# Patient Record
Sex: FEMALE | Race: WHITE | NOT HISPANIC OR LATINO | Employment: FULL TIME | URBAN - METROPOLITAN AREA
[De-identification: names, ages, dates, MRNs, and addresses within clinical notes are randomized per-mention and may not be internally consistent; named-entity substitution may affect disease eponyms.]

---

## 2019-12-07 ENCOUNTER — OFFICE VISIT (OUTPATIENT)
Dept: URGENT CARE | Facility: CLINIC | Age: 59
End: 2019-12-07
Payer: COMMERCIAL

## 2019-12-07 ENCOUNTER — HOSPITAL ENCOUNTER (EMERGENCY)
Facility: MEDICAL CENTER | Age: 59
End: 2019-12-07
Attending: EMERGENCY MEDICINE
Payer: COMMERCIAL

## 2019-12-07 ENCOUNTER — APPOINTMENT (OUTPATIENT)
Dept: RADIOLOGY | Facility: MEDICAL CENTER | Age: 59
End: 2019-12-07
Attending: EMERGENCY MEDICINE
Payer: COMMERCIAL

## 2019-12-07 VITALS
TEMPERATURE: 98.2 F | SYSTOLIC BLOOD PRESSURE: 122 MMHG | DIASTOLIC BLOOD PRESSURE: 62 MMHG | BODY MASS INDEX: 25.4 KG/M2 | HEART RATE: 91 BPM | HEIGHT: 60 IN | RESPIRATION RATE: 12 BRPM | OXYGEN SATURATION: 98 % | WEIGHT: 129.4 LBS

## 2019-12-07 VITALS
HEART RATE: 64 BPM | WEIGHT: 128.53 LBS | DIASTOLIC BLOOD PRESSURE: 68 MMHG | BODY MASS INDEX: 25.91 KG/M2 | TEMPERATURE: 96.5 F | RESPIRATION RATE: 18 BRPM | HEIGHT: 59 IN | OXYGEN SATURATION: 95 % | SYSTOLIC BLOOD PRESSURE: 110 MMHG

## 2019-12-07 DIAGNOSIS — M54.9 UPPER BACK PAIN: ICD-10-CM

## 2019-12-07 DIAGNOSIS — R07.9 CHEST PAIN, UNSPECIFIED TYPE: ICD-10-CM

## 2019-12-07 DIAGNOSIS — R07.89 OTHER CHEST PAIN: ICD-10-CM

## 2019-12-07 DIAGNOSIS — K20.90 ESOPHAGITIS: ICD-10-CM

## 2019-12-07 LAB
ALBUMIN SERPL BCP-MCNC: 4.6 G/DL (ref 3.2–4.9)
ALBUMIN/GLOB SERPL: 1.3 G/DL
ALP SERPL-CCNC: 69 U/L (ref 30–99)
ALT SERPL-CCNC: 18 U/L (ref 2–50)
ANION GAP SERPL CALC-SCNC: 8 MMOL/L (ref 0–11.9)
AST SERPL-CCNC: 24 U/L (ref 12–45)
BASOPHILS # BLD AUTO: 0.9 % (ref 0–1.8)
BASOPHILS # BLD: 0.05 K/UL (ref 0–0.12)
BILIRUB SERPL-MCNC: 0.3 MG/DL (ref 0.1–1.5)
BUN SERPL-MCNC: 27 MG/DL (ref 8–22)
CALCIUM SERPL-MCNC: 9.8 MG/DL (ref 8.5–10.5)
CHLORIDE SERPL-SCNC: 105 MMOL/L (ref 96–112)
CO2 SERPL-SCNC: 27 MMOL/L (ref 20–33)
CREAT SERPL-MCNC: 1.25 MG/DL (ref 0.5–1.4)
EKG IMPRESSION: NORMAL
EOSINOPHIL # BLD AUTO: 0.14 K/UL (ref 0–0.51)
EOSINOPHIL NFR BLD: 2.7 % (ref 0–6.9)
ERYTHROCYTE [DISTWIDTH] IN BLOOD BY AUTOMATED COUNT: 39.8 FL (ref 35.9–50)
GLOBULIN SER CALC-MCNC: 3.6 G/DL (ref 1.9–3.5)
GLUCOSE SERPL-MCNC: 101 MG/DL (ref 65–99)
HCT VFR BLD AUTO: 40 % (ref 37–47)
HGB BLD-MCNC: 13.5 G/DL (ref 12–16)
IMM GRANULOCYTES # BLD AUTO: 0.01 K/UL (ref 0–0.11)
IMM GRANULOCYTES NFR BLD AUTO: 0.2 % (ref 0–0.9)
LIPASE SERPL-CCNC: 28 U/L (ref 11–82)
LYMPHOCYTES # BLD AUTO: 2.17 K/UL (ref 1–4.8)
LYMPHOCYTES NFR BLD: 41.2 % (ref 22–41)
MCH RBC QN AUTO: 30.6 PG (ref 27–33)
MCHC RBC AUTO-ENTMCNC: 33.8 G/DL (ref 33.6–35)
MCV RBC AUTO: 90.7 FL (ref 81.4–97.8)
MONOCYTES # BLD AUTO: 0.34 K/UL (ref 0–0.85)
MONOCYTES NFR BLD AUTO: 6.5 % (ref 0–13.4)
NEUTROPHILS # BLD AUTO: 2.56 K/UL (ref 2–7.15)
NEUTROPHILS NFR BLD: 48.5 % (ref 44–72)
NRBC # BLD AUTO: 0 K/UL
NRBC BLD-RTO: 0 /100 WBC
PLATELET # BLD AUTO: 354 K/UL (ref 164–446)
PMV BLD AUTO: 9.2 FL (ref 9–12.9)
POTASSIUM SERPL-SCNC: 3.9 MMOL/L (ref 3.6–5.5)
PROT SERPL-MCNC: 8.2 G/DL (ref 6–8.2)
RBC # BLD AUTO: 4.41 M/UL (ref 4.2–5.4)
SODIUM SERPL-SCNC: 140 MMOL/L (ref 135–145)
TROPONIN T SERPL-MCNC: <6 NG/L (ref 6–19)
WBC # BLD AUTO: 5.3 K/UL (ref 4.8–10.8)

## 2019-12-07 PROCEDURE — 84484 ASSAY OF TROPONIN QUANT: CPT

## 2019-12-07 PROCEDURE — 99284 EMERGENCY DEPT VISIT MOD MDM: CPT

## 2019-12-07 PROCEDURE — 85025 COMPLETE CBC W/AUTO DIFF WBC: CPT

## 2019-12-07 PROCEDURE — 80053 COMPREHEN METABOLIC PANEL: CPT

## 2019-12-07 PROCEDURE — A9270 NON-COVERED ITEM OR SERVICE: HCPCS | Performed by: EMERGENCY MEDICINE

## 2019-12-07 PROCEDURE — 83690 ASSAY OF LIPASE: CPT

## 2019-12-07 PROCEDURE — 71045 X-RAY EXAM CHEST 1 VIEW: CPT

## 2019-12-07 PROCEDURE — 93005 ELECTROCARDIOGRAM TRACING: CPT | Performed by: EMERGENCY MEDICINE

## 2019-12-07 PROCEDURE — 99205 OFFICE O/P NEW HI 60 MIN: CPT | Performed by: NURSE PRACTITIONER

## 2019-12-07 PROCEDURE — 36415 COLL VENOUS BLD VENIPUNCTURE: CPT

## 2019-12-07 PROCEDURE — 93000 ELECTROCARDIOGRAM COMPLETE: CPT | Performed by: NURSE PRACTITIONER

## 2019-12-07 PROCEDURE — 93005 ELECTROCARDIOGRAM TRACING: CPT

## 2019-12-07 PROCEDURE — 700102 HCHG RX REV CODE 250 W/ 637 OVERRIDE(OP): Performed by: EMERGENCY MEDICINE

## 2019-12-07 RX ADMIN — LIDOCAINE HYDROCHLORIDE 30 ML: 20 SOLUTION OROPHARYNGEAL at 20:51

## 2019-12-07 ASSESSMENT — ENCOUNTER SYMPTOMS
FEVER: 0
CHILLS: 0
BACK PAIN: 1

## 2019-12-08 NOTE — ED PROVIDER NOTES
"ED Provider Note    CHIEF COMPLAINT  Chief Complaint   Patient presents with   • Chest Pain     Described as \"burning,\" started 19, improved when drinking water, nothing makes pain worse   • Neck Pain     Radiates from chest       HPI  Georgette Connor is a 59 y.o. female who presents to the Emergency Department with chest pain which started about 5 days ago.  It is located epigastric area and substernal and feels like something is there,  and has an intensity of 4 with no radiation to the arm, but does radiate to throat.   There is no shortness of breath, no weakness, slight nausea, no abdominal pain, no back pain, no jaw pain, no diaphoresis.  She did have jaw pain the other dayC     CAD Risk factor review:  no CAD, no dislipidemia, positive father for family history-- in 80's of heart disease, no diabetes, nosmoking, no obesity, no hypertension, no cocaine or methamphetamines.    Pulmonary Embolist risk factor review:  no recent surgery, no history of DVT or PE, no hemoptysis,  No unilateral leg swelling, no malignancy, no BCP or hormone therapy.    REVIEW OF SYSTEMS   As above all other systems are negative.    PAST MEDICAL HISTORY   has no past medical history on file.  Hypothyroidism  FAMILY HISTORY  No family history on file.     SOCIAL HISTORY  Social History     Tobacco Use   • Smoking status: Never Smoker   • Smokeless tobacco: Never Used   Substance and Sexual Activity   • Alcohol use: Yes     Comment: Rarely   • Drug use: Never   • Sexual activity: Not on file       SURGICAL HISTORY  patient denies any surgical history    CURRENT MEDICATIONS  Reviewed.  See Encounter Summary.  Include No current facility-administered medications for this encounter.     Current Outpatient Medications:   •  Cholecalciferol (VITAMIN D PO), Take 2,500 Units by mouth., Disp: , Rfl:   •  Thyroid (NATURE-THROID PO), Take  by mouth., Disp: , Rfl:       ALLERGIES  No Known Allergies    PHYSICAL EXAM  VITAL SIGNS: /75  " " Pulse 74   Temp 35.8 °C (96.5 °F) (Temporal)   Resp 16   Ht 1.499 m (4' 11\")   Wt 58.3 kg (128 lb 8.5 oz)   LMP  (LMP Unknown)   SpO2 99%   BMI 25.96 kg/m²   Constitutional:  Alert , able to answer questions  HENT: Nose is normal in appearance, external ears are normal,  moist mucous membranes  Eyes: Anicteric,  pupils are equal round and reactive, there is no conjunctival drainage or pallor   Neck: The trachea is midline, there is no obvious mass or meningeal signs  Cardiovascular: Good perfusion,  regular rate and rhythm without murmurs gallops or rubs  Thorax & Lungs: Respiratory rate and effort are normal. There is normal chest excursion with respiration.  No wheezes rhonchi or rales noted.  Abdomen: Abdomen is normal in appearance, no gross peritoneal signs  normal bowel sounds, no pain with cough  :   No CVA tenderness to palpation  Musculoskeletal: No deformities noted in all 4 extremities.   Skin: Visualized skin is warm without rash.  Neurologic:  Cranial nerves II through XII are intact there is no focal abnormality noted.  Psychiatric: Normal mood and mentation    RADIOLOGY/PROCEDURES  Imaging Studies:    DX-CHEST-PORTABLE (1 VIEW)   Final Result         1. No acute cardiopulmonary abnormalities are identified.            Pertinent Labs   Results for orders placed or performed during the hospital encounter of 12/07/19   CBC with Differential   Result Value Ref Range    WBC 5.3 4.8 - 10.8 K/uL    RBC 4.41 4.20 - 5.40 M/uL    Hemoglobin 13.5 12.0 - 16.0 g/dL    Hematocrit 40.0 37.0 - 47.0 %    MCV 90.7 81.4 - 97.8 fL    MCH 30.6 27.0 - 33.0 pg    MCHC 33.8 33.6 - 35.0 g/dL    RDW 39.8 35.9 - 50.0 fL    Platelet Count 354 164 - 446 K/uL    MPV 9.2 9.0 - 12.9 fL    Neutrophils-Polys 48.50 44.00 - 72.00 %    Lymphocytes 41.20 (H) 22.00 - 41.00 %    Monocytes 6.50 0.00 - 13.40 %    Eosinophils 2.70 0.00 - 6.90 %    Basophils 0.90 0.00 - 1.80 %    Immature Granulocytes 0.20 0.00 - 0.90 %    Nucleated " RBC 0.00 /100 WBC    Neutrophils (Absolute) 2.56 2.00 - 7.15 K/uL    Lymphs (Absolute) 2.17 1.00 - 4.80 K/uL    Monos (Absolute) 0.34 0.00 - 0.85 K/uL    Eos (Absolute) 0.14 0.00 - 0.51 K/uL    Baso (Absolute) 0.05 0.00 - 0.12 K/uL    Immature Granulocytes (abs) 0.01 0.00 - 0.11 K/uL    NRBC (Absolute) 0.00 K/uL   Complete Metabolic Panel (CMP)   Result Value Ref Range    Sodium 140 135 - 145 mmol/L    Potassium 3.9 3.6 - 5.5 mmol/L    Chloride 105 96 - 112 mmol/L    Co2 27 20 - 33 mmol/L    Anion Gap 8.0 0.0 - 11.9    Glucose 101 (H) 65 - 99 mg/dL    Bun 27 (H) 8 - 22 mg/dL    Creatinine 1.25 0.50 - 1.40 mg/dL    Calcium 9.8 8.5 - 10.5 mg/dL    AST(SGOT) 24 12 - 45 U/L    ALT(SGPT) 18 2 - 50 U/L    Alkaline Phosphatase 69 30 - 99 U/L    Total Bilirubin 0.3 0.1 - 1.5 mg/dL    Albumin 4.6 3.2 - 4.9 g/dL    Total Protein 8.2 6.0 - 8.2 g/dL    Globulin 3.6 (H) 1.9 - 3.5 g/dL    A-G Ratio 1.3 g/dL   Troponin STAT   Result Value Ref Range    Troponin T <6 6 - 19 ng/L   LIPASE   Result Value Ref Range    Lipase 28 11 - 82 U/L   ESTIMATED GFR   Result Value Ref Range    GFR If  53 (A) >60 mL/min/1.73 m 2    GFR If Non  44 (A) >60 mL/min/1.73 m 2   EKG   Result Value Ref Range    Report       [facility]    Test Date:  2019  Pt Name:    FLOYD CUNHA                Department: ER  MRN:        [id number]                      Room:  Gender:     Female                       Technician: [id number]  :        1960                   Requested By:ER TRIAGE PROTOCOL  Order #:    [id number]                    Reading MD:    Measurements  Intervals                                Axis  Rate:       69                           P:          21  WI:         169                          QRS:        35  QRSD:       107                          T:          44  QT:         404  QTc:        433    Interpretive Statements  Sinus rhythm  Low voltage, precordial leads  No  previous ECG available for comparison           I interpreted this EKG myself.  This is a 12-lead study.  The rhythm is sinus with a rate of 69.  There are no ST segment nor T wave abnormalities.  Interpretation: No ST segment elevation myocardial infarction..    COURSE & MEDICAL DECISION MAKING  Nursing notes and vital signs were reviewed. (See chart for details)  The patients nursing records were reviewed, history was obtained from the patient ;     The patient presents with chest pain, and the differential diagnosis includes but is not limited to  acute coronary syndrome, anxiety disorder, aortic dissection, esophageal rupture or spasm, gastroesophageal reflux disease, musculoskeletal chest pain, acute myocardial infarction, peptic ulcer disease, pericarditis, pneumothorax, or pulmonary embolism.   I am most concerned about esophagitis as the patient symptoms intensified with drinking water, they radiate from her epigastrium up centrally and she recently had ibuprofen usage.  She is concerned about her cardiac status, she is heart score less than 3 with more than 5 days of symptoms so an troponin with a normal EKG will be beneficial to rule out acute coronary syndrome      Initial orders in the Emergency Department included  CBC, CMP, troponin BNP, PCXR, er and initial treatment in the Emergency Department included and the patient received  GI cocktail with complete resolution of her symptoms    ED testing reveals negative troponin and pt with sxs for >3hours, which if pain were ischemic should cause a positive troponin. Urgent stress testing is of little yield in this pt with Heart <3; discussed this with pt and they are comfortable following with a primary provider regarding this issue. They understand to return to ED and are welcome to return if they develop CP on exertion, SOB, nausea/diaphoresis, pain with radiations to the back or have any other concerns.  No radiation to back, no tearing quality of pain,  CXR w/o enlarged mediastinum; hx inconsistent with dissectionPERC negative _ given hx, pt sxs highly uncharacteristic of VTE, aside from age pt w/o any immediate risk factors for VTE; non-pleuritic pain, normal O2 saturation and no triage tachycardic    Patient at this time most likely has esophagitis she had complete resolution of her symptoms with GI cocktail I discussed using Pepcid Complete and then following up with her primary care for ongoing treatment    FINAL IMPRESSION  1.  Atypical chest pain  2.  Suspected esophagitis       DISPOSITION  Home        FOLLOW UP:  Gemma Abreu M.D.  [address] [place] [address] [id number]  [id number]  [facility] [id number]  [phone]              The patient was discharged home with an information sheet on esophagitis and told to return immediately for any signs or symptoms listed, but specifically if vomtiing, or any worsening at all.  The patient verbally agreed to the discharge precautions and follow-up plan which is documented in EPIC.    Electronically signed by: Amanda Perrin, 12/7/2019 8:43 PM

## 2019-12-08 NOTE — PROGRESS NOTES
"Subjective:      Georgette Connor is a 59 y.o. female who presents with Heartburn (x 6 days.  Pt. complains of \"heartburn\" off and on.  She said today she she is having aching in her neck and radiates down to both shoulders.  She states she might have had cold sweats but said it may be due to her menopause.  Pt. denies any tingling, numbness or dizziness.  Pt. states that she has not been diagnosed with GERD. )    History reviewed. No pertinent past medical history.  Social History     Socioeconomic History   • Marital status: Single     Spouse name: Not on file   • Number of children: Not on file   • Years of education: Not on file   • Highest education level: Not on file   Occupational History   • Not on file   Social Needs   • Financial resource strain: Not on file   • Food insecurity:     Worry: Not on file     Inability: Not on file   • Transportation needs:     Medical: Not on file     Non-medical: Not on file   Tobacco Use   • Smoking status: Never Smoker   • Smokeless tobacco: Never Used   Substance and Sexual Activity   • Alcohol use: Yes     Comment: Rarely   • Drug use: Never   • Sexual activity: Not on file   Lifestyle   • Physical activity:     Days per week: Not on file     Minutes per session: Not on file   • Stress: Not on file   Relationships   • Social connections:     Talks on phone: Not on file     Gets together: Not on file     Attends Jewish service: Not on file     Active member of club or organization: Not on file     Attends meetings of clubs or organizations: Not on file     Relationship status: Not on file   • Intimate partner violence:     Fear of current or ex partner: Not on file     Emotionally abused: Not on file     Physically abused: Not on file     Forced sexual activity: Not on file   Other Topics Concern   • Not on file   Social History Narrative   • Not on file     History reviewed. No pertinent family history.    Allergie: Patient has no allergy information on " "record.    Patient is a 59-year-old female who presents today with complaint of chest pain and upper back pain between the shoulder blades radiating into the neck and the left shoulder.  Symptoms started intermittently over the last week.  Patient states that first she thought she was just having heartburn but now that she is having pain in the upper back and shoulders she is concerned that her pain may be cardiac related.  Patient denies any injury.  States that her pain is not re-created with movement, rather she just has a persistent dull ache.  States she feels mildly lightheaded.  No shortness of breath.          Other   This is a new problem. The problem occurs intermittently. The problem has been unchanged. Associated symptoms include chest pain. Pertinent negatives include no chills or fever. Nothing aggravates the symptoms. She has tried nothing for the symptoms. The treatment provided no relief.       Review of Systems   Constitutional: Positive for malaise/fatigue. Negative for chills and fever.   Cardiovascular: Positive for chest pain.   Musculoskeletal: Positive for back pain.   All other systems reviewed and are negative.         Objective:     /62   Pulse 91   Temp 36.8 °C (98.2 °F) (Temporal)   Resp 12   Ht 1.518 m (4' 11.75\")   Wt 58.7 kg (129 lb 6.4 oz)   LMP  (LMP Unknown)   SpO2 98%   BMI 25.48 kg/m²      Physical Exam  Vitals signs reviewed.   Constitutional:       Appearance: Normal appearance. She is normal weight.   Neck:      Musculoskeletal: Normal range of motion and neck supple.   Cardiovascular:      Rate and Rhythm: Normal rate and regular rhythm.   Pulmonary:      Effort: Pulmonary effort is normal.      Breath sounds: Normal breath sounds.      Comments: There is no point tenderness over the anterior chest wall.  No recreation of pain with range of motion of the neck or shoulders, or upper body.  Chest:      Chest wall: No tenderness.   Abdominal:      General: There is " no distension.      Tenderness: There is no tenderness.   Musculoskeletal: Normal range of motion.   Skin:     General: Skin is warm and dry.   Neurological:      Mental Status: She is alert and oriented to person, place, and time.   Psychiatric:         Mood and Affect: Mood normal.         Behavior: Behavior normal.         Thought Content: Thought content normal.         Judgment: Judgment normal.       EKG: Sinus rhythm, no ST elevation or depression noted.  No ischemia.  No ectopic changes.     Discussed with patient that I cannot rule out cardiac related chest pain in the urgent care.  I am concerned about the vague nature of her pain and the fact that it cannot be re-created with movement.  I have advised patient strongly that I believe she needs to go to ER for further evaluation and higher level of care.  Patient verbalized understanding and agreement, states she will proceed to ER for further evaluation.  Declined transfer via ambulance at this time.  She is accompanied by her  who states he will take her to ER for further evaluation.     Assessment/Plan:   Chest pain  Upper back pain  Neck pain    EKG obtained  Referred to ER for further evaluation and higher level of care.   Declined transfer via EMS     There are no diagnoses linked to this encounter.

## 2019-12-08 NOTE — ED TRIAGE NOTES
"Chief Complaint   Patient presents with   • Chest Pain     Described as \"burning,\" started 12/2/19, improved when drinking water, nothing makes pain worse   • Neck Pain     Radiates from chest     "

## 2021-10-07 ENCOUNTER — DOCUMENTATION (OUTPATIENT)
Dept: PHARMACY | Facility: MEDICAL CENTER | Age: 61
End: 2021-10-07
Payer: COMMERCIAL